# Patient Record
Sex: MALE | Race: WHITE | NOT HISPANIC OR LATINO
[De-identification: names, ages, dates, MRNs, and addresses within clinical notes are randomized per-mention and may not be internally consistent; named-entity substitution may affect disease eponyms.]

---
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
## 2025-04-01 NOTE — RISK ASSESSMENT
FreeTextEntry1] : discussed eliminating screens for about 1 hour before sleep [[id number]] : 3